# Patient Record
Sex: MALE | Race: WHITE
[De-identification: names, ages, dates, MRNs, and addresses within clinical notes are randomized per-mention and may not be internally consistent; named-entity substitution may affect disease eponyms.]

---

## 2018-10-04 ENCOUNTER — HOSPITAL ENCOUNTER (EMERGENCY)
Dept: HOSPITAL 54 - ER | Age: 33
Discharge: HOME | End: 2018-10-04
Payer: SELF-PAY

## 2018-10-04 ENCOUNTER — HOSPITAL ENCOUNTER (EMERGENCY)
Dept: HOSPITAL 12 - ER | Age: 33
Discharge: HOME | End: 2018-10-04
Payer: SELF-PAY

## 2018-10-04 VITALS — SYSTOLIC BLOOD PRESSURE: 129 MMHG | DIASTOLIC BLOOD PRESSURE: 91 MMHG

## 2018-10-04 VITALS
SYSTOLIC BLOOD PRESSURE: 135 MMHG | WEIGHT: 160 LBS | HEIGHT: 70 IN | DIASTOLIC BLOOD PRESSURE: 79 MMHG | BODY MASS INDEX: 22.9 KG/M2

## 2018-10-04 VITALS — HEIGHT: 69 IN | WEIGHT: 160 LBS | BODY MASS INDEX: 23.7 KG/M2

## 2018-10-04 VITALS — HEIGHT: 68 IN | BODY MASS INDEX: 24.25 KG/M2 | WEIGHT: 160 LBS
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
## 2018-10-04 DIAGNOSIS — Z87.19: ICD-10-CM
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
## 2018-10-04 DIAGNOSIS — S05.02XA: Primary | ICD-10-CM

## 2018-10-04 DIAGNOSIS — R04.0: Primary | ICD-10-CM
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
## 2018-10-04 DIAGNOSIS — H57.89: Primary | ICD-10-CM

## 2018-10-04 DIAGNOSIS — Z59.0: ICD-10-CM

## 2018-10-04 PROCEDURE — A4606 OXYGEN PROBE USED W OXIMETER: HCPCS

## 2018-10-04 PROCEDURE — 99281 EMR DPT VST MAYX REQ PHY/QHP: CPT

## 2018-10-04 PROCEDURE — Z7610: HCPCS

## 2018-10-04 PROCEDURE — A4663 DIALYSIS BLOOD PRESSURE CUFF: HCPCS

## 2018-10-04 PROCEDURE — 99283 EMERGENCY DEPT VISIT LOW MDM: CPT

## 2018-10-04 PROCEDURE — Z7502: HCPCS

## 2018-10-04 SDOH — ECONOMIC STABILITY - HOUSING INSECURITY: HOMELESSNESS: Z59.0

## 2018-10-04 NOTE — NUR
Patient discharged to home in stable conditon.  Written and verbal after care 
instructions given. 

Patient verbalizes understanding of instructions.PT WALKS IN STEADY GAIT. PT 
REFUSED EYE DROP AND EYE AND EYE PATCH.